# Patient Record
Sex: MALE | Race: BLACK OR AFRICAN AMERICAN | NOT HISPANIC OR LATINO | URBAN - METROPOLITAN AREA
[De-identification: names, ages, dates, MRNs, and addresses within clinical notes are randomized per-mention and may not be internally consistent; named-entity substitution may affect disease eponyms.]

---

## 2020-09-10 ENCOUNTER — INPATIENT (INPATIENT)
Facility: HOSPITAL | Age: 40
LOS: 3 days | Discharge: HOME | End: 2020-09-14
Attending: INTERNAL MEDICINE | Admitting: INTERNAL MEDICINE
Payer: MEDICAID

## 2020-09-10 VITALS
HEIGHT: 69 IN | TEMPERATURE: 102 F | SYSTOLIC BLOOD PRESSURE: 140 MMHG | WEIGHT: 154.98 LBS | RESPIRATION RATE: 19 BRPM | DIASTOLIC BLOOD PRESSURE: 80 MMHG | OXYGEN SATURATION: 100 % | HEART RATE: 83 BPM

## 2020-09-10 DIAGNOSIS — H00.036 ABSCESS OF EYELID LEFT EYE, UNSPECIFIED EYELID: ICD-10-CM

## 2020-09-10 DIAGNOSIS — B02.31 ZOSTER CONJUNCTIVITIS: ICD-10-CM

## 2020-09-10 LAB
ALBUMIN SERPL ELPH-MCNC: 3.7 G/DL — SIGNIFICANT CHANGE UP (ref 3.3–5)
ALP SERPL-CCNC: 52 U/L — SIGNIFICANT CHANGE UP (ref 40–120)
ALT FLD-CCNC: 20 U/L — SIGNIFICANT CHANGE UP (ref 12–78)
ANION GAP SERPL CALC-SCNC: 4 MMOL/L — LOW (ref 5–17)
APPEARANCE UR: CLEAR — SIGNIFICANT CHANGE UP
APTT BLD: 33.6 SEC — SIGNIFICANT CHANGE UP (ref 27.5–35.5)
AST SERPL-CCNC: 23 U/L — SIGNIFICANT CHANGE UP (ref 15–37)
BASOPHILS # BLD AUTO: 0.05 K/UL — SIGNIFICANT CHANGE UP (ref 0–0.2)
BASOPHILS NFR BLD AUTO: 0.9 % — SIGNIFICANT CHANGE UP (ref 0–2)
BILIRUB SERPL-MCNC: 0.4 MG/DL — SIGNIFICANT CHANGE UP (ref 0.2–1.2)
BILIRUB UR-MCNC: NEGATIVE — SIGNIFICANT CHANGE UP
BUN SERPL-MCNC: 3 MG/DL — LOW (ref 7–23)
CALCIUM SERPL-MCNC: 8.9 MG/DL — SIGNIFICANT CHANGE UP (ref 8.5–10.1)
CHLORIDE SERPL-SCNC: 103 MMOL/L — SIGNIFICANT CHANGE UP (ref 96–108)
CO2 SERPL-SCNC: 29 MMOL/L — SIGNIFICANT CHANGE UP (ref 22–31)
COLOR SPEC: YELLOW — SIGNIFICANT CHANGE UP
CREAT SERPL-MCNC: 0.92 MG/DL — SIGNIFICANT CHANGE UP (ref 0.5–1.3)
DIFF PNL FLD: NEGATIVE — SIGNIFICANT CHANGE UP
EOSINOPHIL # BLD AUTO: 0.01 K/UL — SIGNIFICANT CHANGE UP (ref 0–0.5)
EOSINOPHIL NFR BLD AUTO: 0.2 % — SIGNIFICANT CHANGE UP (ref 0–6)
GLUCOSE SERPL-MCNC: 98 MG/DL — SIGNIFICANT CHANGE UP (ref 70–99)
GLUCOSE UR QL: NEGATIVE MG/DL — SIGNIFICANT CHANGE UP
HCT VFR BLD CALC: 39.4 % — SIGNIFICANT CHANGE UP (ref 39–50)
HCT VFR BLD CALC: 42.8 % — SIGNIFICANT CHANGE UP (ref 39–50)
HGB BLD-MCNC: 13 G/DL — SIGNIFICANT CHANGE UP (ref 13–17)
HGB BLD-MCNC: 14.3 G/DL — SIGNIFICANT CHANGE UP (ref 13–17)
HIV 1 & 2 AB SERPL IA.RAPID: SIGNIFICANT CHANGE UP
IMM GRANULOCYTES NFR BLD AUTO: 0.2 % — SIGNIFICANT CHANGE UP (ref 0–1.5)
INR BLD: 1.09 RATIO — SIGNIFICANT CHANGE UP (ref 0.88–1.16)
KETONES UR-MCNC: NEGATIVE — SIGNIFICANT CHANGE UP
LACTATE SERPL-SCNC: 1.5 MMOL/L — SIGNIFICANT CHANGE UP (ref 0.7–2)
LEUKOCYTE ESTERASE UR-ACNC: NEGATIVE — SIGNIFICANT CHANGE UP
LYMPHOCYTES # BLD AUTO: 1.01 K/UL — SIGNIFICANT CHANGE UP (ref 1–3.3)
LYMPHOCYTES # BLD AUTO: 18.5 % — SIGNIFICANT CHANGE UP (ref 13–44)
MCHC RBC-ENTMCNC: 30.4 PG — SIGNIFICANT CHANGE UP (ref 27–34)
MCHC RBC-ENTMCNC: 30.7 PG — SIGNIFICANT CHANGE UP (ref 27–34)
MCHC RBC-ENTMCNC: 33 GM/DL — SIGNIFICANT CHANGE UP (ref 32–36)
MCHC RBC-ENTMCNC: 33.4 GM/DL — SIGNIFICANT CHANGE UP (ref 32–36)
MCV RBC AUTO: 91.1 FL — SIGNIFICANT CHANGE UP (ref 80–100)
MCV RBC AUTO: 92.9 FL — SIGNIFICANT CHANGE UP (ref 80–100)
MONOCYTES # BLD AUTO: 0.53 K/UL — SIGNIFICANT CHANGE UP (ref 0–0.9)
MONOCYTES NFR BLD AUTO: 9.7 % — SIGNIFICANT CHANGE UP (ref 2–14)
NEUTROPHILS # BLD AUTO: 3.84 K/UL — SIGNIFICANT CHANGE UP (ref 1.8–7.4)
NEUTROPHILS NFR BLD AUTO: 70.5 % — SIGNIFICANT CHANGE UP (ref 43–77)
NITRITE UR-MCNC: NEGATIVE — SIGNIFICANT CHANGE UP
NRBC # BLD: 0 /100 WBCS — SIGNIFICANT CHANGE UP (ref 0–0)
NRBC # BLD: 0 /100 WBCS — SIGNIFICANT CHANGE UP (ref 0–0)
PH UR: 8 — SIGNIFICANT CHANGE UP (ref 5–8)
PLATELET # BLD AUTO: 201 K/UL — SIGNIFICANT CHANGE UP (ref 150–400)
PLATELET # BLD AUTO: 212 K/UL — SIGNIFICANT CHANGE UP (ref 150–400)
POTASSIUM SERPL-MCNC: 4 MMOL/L — SIGNIFICANT CHANGE UP (ref 3.5–5.3)
POTASSIUM SERPL-SCNC: 4 MMOL/L — SIGNIFICANT CHANGE UP (ref 3.5–5.3)
PROT SERPL-MCNC: 7.4 GM/DL — SIGNIFICANT CHANGE UP (ref 6–8.3)
PROT UR-MCNC: NEGATIVE MG/DL — SIGNIFICANT CHANGE UP
PROTHROM AB SERPL-ACNC: 12.6 SEC — SIGNIFICANT CHANGE UP (ref 10.6–13.6)
RBC # BLD: 4.24 M/UL — SIGNIFICANT CHANGE UP (ref 4.2–5.8)
RBC # BLD: 4.7 M/UL — SIGNIFICANT CHANGE UP (ref 4.2–5.8)
RBC # FLD: 12.8 % — SIGNIFICANT CHANGE UP (ref 10.3–14.5)
RBC # FLD: 13 % — SIGNIFICANT CHANGE UP (ref 10.3–14.5)
SARS-COV-2 RNA SPEC QL NAA+PROBE: SIGNIFICANT CHANGE UP
SODIUM SERPL-SCNC: 136 MMOL/L — SIGNIFICANT CHANGE UP (ref 135–145)
SP GR SPEC: 1.01 — SIGNIFICANT CHANGE UP (ref 1.01–1.02)
UROBILINOGEN FLD QL: NEGATIVE MG/DL — SIGNIFICANT CHANGE UP
WBC # BLD: 4.84 K/UL — SIGNIFICANT CHANGE UP (ref 3.8–10.5)
WBC # BLD: 5.45 K/UL — SIGNIFICANT CHANGE UP (ref 3.8–10.5)
WBC # FLD AUTO: 4.84 K/UL — SIGNIFICANT CHANGE UP (ref 3.8–10.5)
WBC # FLD AUTO: 5.45 K/UL — SIGNIFICANT CHANGE UP (ref 3.8–10.5)

## 2020-09-10 PROCEDURE — 93010 ELECTROCARDIOGRAM REPORT: CPT

## 2020-09-10 PROCEDURE — 70480 CT ORBIT/EAR/FOSSA W/O DYE: CPT | Mod: 26,59

## 2020-09-10 PROCEDURE — 71045 X-RAY EXAM CHEST 1 VIEW: CPT | Mod: 26

## 2020-09-10 PROCEDURE — 99223 1ST HOSP IP/OBS HIGH 75: CPT

## 2020-09-10 PROCEDURE — 99285 EMERGENCY DEPT VISIT HI MDM: CPT

## 2020-09-10 PROCEDURE — 70450 CT HEAD/BRAIN W/O DYE: CPT | Mod: 26

## 2020-09-10 RX ORDER — ACETAMINOPHEN 500 MG
650 TABLET ORAL EVERY 6 HOURS
Refills: 0 | Status: DISCONTINUED | OUTPATIENT
Start: 2020-09-10 | End: 2020-09-14

## 2020-09-10 RX ORDER — OXYCODONE HYDROCHLORIDE 5 MG/1
5 TABLET ORAL ONCE
Refills: 0 | Status: DISCONTINUED | OUTPATIENT
Start: 2020-09-10 | End: 2020-09-10

## 2020-09-10 RX ORDER — MORPHINE SULFATE 50 MG/1
4 CAPSULE, EXTENDED RELEASE ORAL ONCE
Refills: 0 | Status: DISCONTINUED | OUTPATIENT
Start: 2020-09-10 | End: 2020-09-10

## 2020-09-10 RX ORDER — CEFAZOLIN SODIUM 1 G
2000 VIAL (EA) INJECTION EVERY 8 HOURS
Refills: 0 | Status: DISCONTINUED | OUTPATIENT
Start: 2020-09-10 | End: 2020-09-11

## 2020-09-10 RX ORDER — CEFAZOLIN SODIUM 1 G
2000 VIAL (EA) INJECTION EVERY 8 HOURS
Refills: 0 | Status: DISCONTINUED | OUTPATIENT
Start: 2020-09-10 | End: 2020-09-10

## 2020-09-10 RX ORDER — CEFAZOLIN SODIUM 1 G
2000 VIAL (EA) INJECTION ONCE
Refills: 0 | Status: COMPLETED | OUTPATIENT
Start: 2020-09-10 | End: 2020-09-10

## 2020-09-10 RX ORDER — ACYCLOVIR SODIUM 500 MG
350 VIAL (EA) INTRAVENOUS EVERY 8 HOURS
Refills: 0 | Status: DISCONTINUED | OUTPATIENT
Start: 2020-09-10 | End: 2020-09-14

## 2020-09-10 RX ORDER — ONDANSETRON 8 MG/1
4 TABLET, FILM COATED ORAL ONCE
Refills: 0 | Status: COMPLETED | OUTPATIENT
Start: 2020-09-10 | End: 2020-09-10

## 2020-09-10 RX ORDER — IBUPROFEN 200 MG
600 TABLET ORAL ONCE
Refills: 0 | Status: COMPLETED | OUTPATIENT
Start: 2020-09-10 | End: 2020-09-10

## 2020-09-10 RX ORDER — SODIUM CHLORIDE 9 MG/ML
1000 INJECTION INTRAMUSCULAR; INTRAVENOUS; SUBCUTANEOUS ONCE
Refills: 0 | Status: COMPLETED | OUTPATIENT
Start: 2020-09-10 | End: 2020-09-10

## 2020-09-10 RX ORDER — ACYCLOVIR SODIUM 500 MG
700 VIAL (EA) INTRAVENOUS ONCE
Refills: 0 | Status: COMPLETED | OUTPATIENT
Start: 2020-09-10 | End: 2020-09-10

## 2020-09-10 RX ORDER — ACETAMINOPHEN 500 MG
975 TABLET ORAL ONCE
Refills: 0 | Status: COMPLETED | OUTPATIENT
Start: 2020-09-10 | End: 2020-09-10

## 2020-09-10 RX ADMIN — SODIUM CHLORIDE 1000 MILLILITER(S): 9 INJECTION INTRAMUSCULAR; INTRAVENOUS; SUBCUTANEOUS at 14:30

## 2020-09-10 RX ADMIN — Medication 100 MILLIGRAM(S): at 21:34

## 2020-09-10 RX ADMIN — Medication 650 MILLIGRAM(S): at 21:33

## 2020-09-10 RX ADMIN — Medication 600 MILLIGRAM(S): at 14:05

## 2020-09-10 RX ADMIN — Medication 107 MILLIGRAM(S): at 21:34

## 2020-09-10 RX ADMIN — MORPHINE SULFATE 4 MILLIGRAM(S): 50 CAPSULE, EXTENDED RELEASE ORAL at 14:05

## 2020-09-10 RX ADMIN — Medication 975 MILLIGRAM(S): at 15:24

## 2020-09-10 RX ADMIN — Medication 650 MILLIGRAM(S): at 23:59

## 2020-09-10 RX ADMIN — SODIUM CHLORIDE 1000 MILLILITER(S): 9 INJECTION INTRAMUSCULAR; INTRAVENOUS; SUBCUTANEOUS at 13:36

## 2020-09-10 RX ADMIN — Medication 600 MILLIGRAM(S): at 13:35

## 2020-09-10 RX ADMIN — Medication 975 MILLIGRAM(S): at 14:54

## 2020-09-10 RX ADMIN — MORPHINE SULFATE 4 MILLIGRAM(S): 50 CAPSULE, EXTENDED RELEASE ORAL at 14:30

## 2020-09-10 RX ADMIN — Medication 114 MILLIGRAM(S): at 15:55

## 2020-09-10 RX ADMIN — Medication 100 MILLIGRAM(S): at 17:01

## 2020-09-10 RX ADMIN — ONDANSETRON 4 MILLIGRAM(S): 8 TABLET, FILM COATED ORAL at 14:05

## 2020-09-10 NOTE — PATIENT PROFILE ADULT - NSPROSPIRITUALVALUESFT_GEN_A_NUR
none at this time No Repair - Repaired With Adjacent Surgical Defect Text (Leave Blank If You Do Not Want): After obtaining clear surgical margins the defect was repaired concurrently with another surgical defect which was in close approximation.

## 2020-09-10 NOTE — ED PROVIDER NOTE - CARE PLAN
Principal Discharge DX:	Shingles rash  Secondary Diagnosis:	Eyelid cellulitis, left  Secondary Diagnosis:	Fever

## 2020-09-10 NOTE — ED PROVIDER NOTE - CLINICAL SUMMARY MEDICAL DECISION MAKING FREE TEXT BOX
pt presented with shingles of his face x 2 days, left eyelid cellulitis and fever, pt admitted for iv antibiotics and antivirals, ID consult

## 2020-09-10 NOTE — ED ADULT NURSE NOTE - OBJECTIVE STATEMENT
patient presents to the ED for left sonia pustules in clusters which was first noticed on Sunday. denies pain     Denies chest pain and shortness of breath

## 2020-09-10 NOTE — H&P ADULT - NSHPPHYSICALEXAM_GEN_ALL_CORE
ICU Vital Signs Last 24 Hrs  T(C): 37.7 (10 Sep 2020 15:42), Max: 39.4 (10 Sep 2020 14:28)  T(F): 99.9 (10 Sep 2020 15:42), Max: 103 (10 Sep 2020 14:28)  HR: 86 (10 Sep 2020 14:06) (83 - 86)  BP: 127/75 (10 Sep 2020 14:06) (127/75 - 140/80)  BP(mean): --  ABP: --  ABP(mean): --  RR: 18 (10 Sep 2020 14:06) (18 - 19)  SpO2: 100% (10 Sep 2020 10:41) (100% - 100%)  GENERAL: NAD well-developed  HEAD:  Atraumatic, Normocephalic  EYES: shingles to left forehead  and eye swellling   ENMT: No tonsillar erythema, exudates, or enlargement; Moist mucous membranes, Good dentition, No lesions  NECK: Supple, No JVD, Normal thyroid  NERVOUS SYSTEM:  Alert & Oriented X3, Good concentration; Motor Strength 5/5 B/L upper and lower extremities; DTRs 2+ intact and symmetric  CHEST/LUNG: Clear to percussion bilaterally; No rales, rhonchi, wheezing, or rubs  HEART: Regular rate and rhythm; No murmurs, rubs, or gallops  ABDOMEN: Soft, Nontender, Nondistended; Bowel sounds present  EXTREMITIES:  2+ Peripheral Pulses, No clubbing, cyanosis, or edema  LYMPH: No lymphadenopathy   SKIN: No rashes or lesions

## 2020-09-10 NOTE — ED PROVIDER NOTE - OBJECTIVE STATEMENT
39 year old with no past medical history presents today c/o facial rash for the last two days, pt describes pull filled areas which did open in some areas with fever today to the left face and mid face, +left ear tenderness anteriorly and swelling +left upper lid swelling, he  pt does report having a runny nose and cough within the last week, pt denies sick contacts or recent travel +h/o chicken pox

## 2020-09-10 NOTE — H&P ADULT - NSHPREVIEWOFSYSTEMS_GEN_ALL_CORE
CONSTITUTIONAL: No fever, weight loss, or fatigue  EYES: left  eye pain, visual disturbances,   ENMT:  No difficulty hearing, tinnitus, vertigo; No sinus or throat pain  NECK: No pain or stiffness  RESPIRATORY: No cough, wheezing, chills or hemoptysis; No shortness of breath  CARDIOVASCULAR: No chest pain, palpitations, dizziness, or leg swelling  GASTROINTESTINAL: No abdominal or epigastric pain. No nausea, vomiting, or hematemesis; No diarrhea or constipation. No melena or hematochezia.  GENITOURINARY: No dysuria, frequency, hematuria, or incontinence  NEUROLOGICAL: No headaches, memory loss, loss of strength, numbness, or tremors  SKIN: No itching, burning, rashes, or lesions   LYMPH NODES: No enlarged glands  ENDOCRINE: No heat or cold intolerance; No hair loss  MUSCULOSKELETAL: No joint pain or swelling; No muscle, back, or extremity pain  PSYCHIATRIC: No depression, anxiety, mood swings, or difficulty sleeping  HEME/LYMPH: No easy bruising, or bleeding gums  ALLERGY AND IMMUNOLOGIC: No hives or eczema

## 2020-09-11 LAB
CULTURE RESULTS: SIGNIFICANT CHANGE UP
SARS-COV-2 IGG SERPL QL IA: NEGATIVE — SIGNIFICANT CHANGE UP
SARS-COV-2 IGM SERPL IA-ACNC: <0.1 INDEX — SIGNIFICANT CHANGE UP
SPECIMEN SOURCE: SIGNIFICANT CHANGE UP

## 2020-09-11 PROCEDURE — 99233 SBSQ HOSP IP/OBS HIGH 50: CPT

## 2020-09-11 RX ORDER — VANCOMYCIN HCL 1 G
1000 VIAL (EA) INTRAVENOUS EVERY 12 HOURS
Refills: 0 | Status: DISCONTINUED | OUTPATIENT
Start: 2020-09-11 | End: 2020-09-13

## 2020-09-11 RX ORDER — CEFTRIAXONE 500 MG/1
2000 INJECTION, POWDER, FOR SOLUTION INTRAMUSCULAR; INTRAVENOUS EVERY 24 HOURS
Refills: 0 | Status: DISCONTINUED | OUTPATIENT
Start: 2020-09-11 | End: 2020-09-14

## 2020-09-11 RX ORDER — LANOLIN ALCOHOL/MO/W.PET/CERES
3 CREAM (GRAM) TOPICAL AT BEDTIME
Refills: 0 | Status: DISCONTINUED | OUTPATIENT
Start: 2020-09-11 | End: 2020-09-14

## 2020-09-11 RX ORDER — OXYCODONE HYDROCHLORIDE 5 MG/1
5 TABLET ORAL EVERY 6 HOURS
Refills: 0 | Status: DISCONTINUED | OUTPATIENT
Start: 2020-09-11 | End: 2020-09-14

## 2020-09-11 RX ORDER — ACETAMINOPHEN 500 MG
650 TABLET ORAL EVERY 6 HOURS
Refills: 0 | Status: DISCONTINUED | OUTPATIENT
Start: 2020-09-11 | End: 2020-09-14

## 2020-09-11 RX ADMIN — Medication 650 MILLIGRAM(S): at 10:55

## 2020-09-11 RX ADMIN — OXYCODONE HYDROCHLORIDE 5 MILLIGRAM(S): 5 TABLET ORAL at 00:52

## 2020-09-11 RX ADMIN — Medication 100 MILLIGRAM(S): at 05:39

## 2020-09-11 RX ADMIN — Medication 3 MILLIGRAM(S): at 21:13

## 2020-09-11 RX ADMIN — OXYCODONE HYDROCHLORIDE 5 MILLIGRAM(S): 5 TABLET ORAL at 16:27

## 2020-09-11 RX ADMIN — OXYCODONE HYDROCHLORIDE 5 MILLIGRAM(S): 5 TABLET ORAL at 17:27

## 2020-09-11 RX ADMIN — OXYCODONE HYDROCHLORIDE 5 MILLIGRAM(S): 5 TABLET ORAL at 05:40

## 2020-09-11 RX ADMIN — Medication 250 MILLIGRAM(S): at 17:39

## 2020-09-11 RX ADMIN — Medication 107 MILLIGRAM(S): at 14:23

## 2020-09-11 RX ADMIN — OXYCODONE HYDROCHLORIDE 5 MILLIGRAM(S): 5 TABLET ORAL at 00:22

## 2020-09-11 RX ADMIN — Medication 107 MILLIGRAM(S): at 05:39

## 2020-09-11 RX ADMIN — Medication 107 MILLIGRAM(S): at 21:12

## 2020-09-11 RX ADMIN — CEFTRIAXONE 100 MILLIGRAM(S): 500 INJECTION, POWDER, FOR SOLUTION INTRAMUSCULAR; INTRAVENOUS at 16:27

## 2020-09-11 RX ADMIN — Medication 650 MILLIGRAM(S): at 06:00

## 2020-09-11 NOTE — CONSULT NOTE ADULT - ASSESSMENT
39 year old man with no past medical history presents today c/o facial rash for the last two days, pt describes pus filled areas which did open in some areas with fever today to the left face and mid face, +left ear tenderness anteriorly and swelling +left upper lid swelling, he  pt does report having a runny nose and cough within the last week, pt denies sick contacts or recent travel +h/o chicken pox.  Admitted for Herpes zoster conjunctivitis.    Several episode of fevers reported  No leukocytosis    UA negative for infection    HIV NR    Head CT:    Marked left orbital preseptal cellulitis. No evidence of post septal cellulitis.     There is extensive subcutaneous edema and inflammation of the soft tissues of the left face. No drainable focal fluid collection is identified.     CXR: No infiltrates seen 39 year old man with no past medical history presents today c/o facial rash for the last two days, pt describes pus filled areas which did open in some areas with fever today to the left face and mid face, +left ear tenderness anteriorly and swelling +left upper lid swelling, he  pt does report having a runny nose and cough within the last week, pt denies sick contacts or recent travel +h/o chicken pox.  Admitted for Herpes zoster conjunctivitis.    Several episode of fevers reported  No leukocytosis    UA negative for infection    HIV NR    Head CT:    Marked left orbital preseptal cellulitis. No evidence of post septal cellulitis.     There is extensive subcutaneous edema and inflammation of the soft tissues of the left face. No drainable focal fluid collection is identified.     CXR: No infiltrates seen    Dx:  Left Herpes Zoster Ophthalmicus  Left Preseptal cellulitis    Rec:   -Continue Isolation precautions  -Continue Acyclovir IV for now  -Switch antibiotics to Vanco and Ceftriaxone  -Opthalmology evaluation  -Consider ENT evaluation  -BC x2    Thank you for the courtesy of this consultation.  Patient will be follow up closely with you.

## 2020-09-11 NOTE — CONSULT NOTE ADULT - SUBJECTIVE AND OBJECTIVE BOX
HPI:  39 year old man with no past medical history presents today c/o facial rash for the last two days, pt describes pus filled areas which did open in some areas with fever today to the left face and mid face, +left ear tenderness anteriorly and swelling +left upper lid swelling, he  pt does report having a runny nose and cough within the last week, pt denies sick contacts or recent travel +h/o chicken pox (10 Sep 2020 16:51)      PAST MEDICAL & SURGICAL HISTORY:  No pertinent past medical history  No significant past surgical history      SOCHX:   tobacco,  -  alcohol    FMHX: FA/MO  - contributory       Recent Travel:    Immunizations:    Allergies    No Known Allergies    Intolerances        MEDICATIONS  (STANDING):  acyclovir IVPB 350 milliGRAM(s) IV Intermittent every 8 hours  ceFAZolin   IVPB 2000 milliGRAM(s) IV Intermittent every 8 hours    MEDICATIONS  (PRN):  acetaminophen   Tablet .. 650 milliGRAM(s) Oral every 6 hours PRN Mild Pain (1 - 3)  acetaminophen   Tablet .. 650 milliGRAM(s) Oral every 6 hours PRN Temp greater or equal to 38C (100.4F)  oxyCODONE    IR 5 milliGRAM(s) Oral every 6 hours PRN Moderate Pain (4 - 6)      REVIEW OF SYSTEMS:  CONSTITUTIONAL: No fever, weight loss, or fatigue  EYES: No eye pain, visual disturbances, or discharge  ENMT:  No difficulty hearing, tinnitus, vertigo; No sinus or throat pain  NECK: No pain or stiffness  BREASTS: No pain, masses, or nipple discharge  RESPIRATORY: No cough, wheezing, chills or hemoptysis; No shortness of breath  CARDIOVASCULAR: No chest pain, palpitations, dizziness, or leg swelling  GASTROINTESTINAL: No abdominal or epigastric pain. No nausea, vomiting, or hematemesis; No diarrhea or constipation. No melena or hematochezia.  GENITOURINARY: No dysuria, frequency, hematuria, or incontinence  NEUROLOGICAL: No headaches, memory loss, loss of strength, numbness, or tremors  SKIN: No itching, burning, rashes, or lesions   LYMPH NODES: No enlarged glands  ENDOCRINE: No heat or cold intolerance; No hair loss  MUSCULOSKELETAL: No joint pain or swelling; No muscle, back, or extremity pain  PSYCHIATRIC: No depression, anxiety, mood swings, or difficulty sleeping  HEME/LYMPH: No easy bruising, or bleeding gums  ALLERGY AND IMMUNOLOGIC: No hives or eczema    VITAL SIGNS:    T(C): 39.1 (20 @ 05:56), Max: 39.4 (09-10-20 @ 14:28)  T(F): 102.3 (20 @ 05:56), Max: 103 (09-10-20 @ 14:28)  HR: 90 (20 @ 05:56) (75 - 92)  BP: 128/71 (20 @ 05:56) (121/69 - 140/80)  RR: 17 (20 @ 05:56) (14 - 19)  SpO2: 99% (20 @ 05:56) (97% - 100%)    PHYSICAL EXAM:    GENERAL: NAD, well-groomed, well-developed  HEAD:  Atraumatic, Normocephalic  EYES: EOMI, PERRLA, conjunctiva and sclera clear  ENMT: No tonsillar erythema, exudates, or enlargement; Moist mucous membranes, Good dentition, No lesions  NECK: Supple, No JVD, Normal thyroid  NERVOUS SYSTEM:  Alert & Oriented X3, Good concentration; Motor Strength 5/5 B/L upper and lower extremities; DTRs 2+ intact and symmetric  CHEST/LUNG: Clear bilaterally; No rales, rhonchi, wheezing bilaterally  HEART: Regular rate and rhythm; No murmurs, rubs, or gallops  ABDOMEN: Soft, Nontender, Nondistended; Bowel sounds present  EXTREMITIES:  2+ Peripheral Pulses, No clubbing, cyanosis, or edema  LYMPH: No lymphadenopathy noted  SKIN: No rashes or lesions  BACK: no pressor sore     LABS:                         13.0   4.84  )-----------( 201      ( 10 Sep 2020 17:48 )             39.4     09-10    136  |  103  |  3<L>  ----------------------------<  98  4.0   |  29  |  0.92    Ca    8.9      10 Sep 2020 14:03    TPro  7.4  /  Alb  3.7  /  TBili  0.4  /  DBili  x   /  AST  23  /  ALT  20  /  AlkPhos  52  09-10    LIVER FUNCTIONS - ( 10 Sep 2020 14:03 )  Alb: 3.7 g/dL / Pro: 7.4 gm/dL / ALK PHOS: 52 U/L / ALT: 20 U/L / AST: 23 U/L / GGT: x           PT/INR - ( 10 Sep 2020 14:03 )   PT: 12.6 sec;   INR: 1.09 ratio         PTT - ( 10 Sep 2020 14:03 )  PTT:33.6 sec  Urinalysis Basic - ( 10 Sep 2020 14:37 )    Color: Yellow / Appearance: Clear / S.010 / pH: x  Gluc: x / Ketone: Negative  / Bili: Negative / Urobili: Negative mg/dL   Blood: x / Protein: Negative mg/dL / Nitrite: Negative   Leuk Esterase: Negative / RBC: x / WBC x   Sq Epi: x / Non Sq Epi: x / Bacteria: x      Rapid HIV-1/2 Antibody: Nonreact (09-10 @ 17:48)        Radiology: HPI:  39 year old man with no past medical history presents today c/o facial rash for the last two days, pt describes pus filled areas which did open in some areas with fever today to the left face and mid face, +left ear tenderness anteriorly and swelling +left upper lid swelling, he  pt does report having a runny nose and cough within the last week, pt denies sick contacts or recent travel +h/o chicken pox (10 Sep 2020 16:51)      PAST MEDICAL & SURGICAL HISTORY:  No pertinent past medical history  No significant past surgical history      SOCHX:   tobacco,  -  alcohol    FMHX: FA/MO  - contributory       Recent Travel:    Immunizations:    Allergies    No Known Allergies    Intolerances        MEDICATIONS  (STANDING):  acyclovir IVPB 350 milliGRAM(s) IV Intermittent every 8 hours  ceFAZolin   IVPB 2000 milliGRAM(s) IV Intermittent every 8 hours    MEDICATIONS  (PRN):  acetaminophen   Tablet .. 650 milliGRAM(s) Oral every 6 hours PRN Mild Pain (1 - 3)  acetaminophen   Tablet .. 650 milliGRAM(s) Oral every 6 hours PRN Temp greater or equal to 38C (100.4F)  oxyCODONE    IR 5 milliGRAM(s) Oral every 6 hours PRN Moderate Pain (4 - 6)      REVIEW OF SYSTEMS:  CONSTITUTIONAL: No fever, weight loss, or fatigue  EYES: No eye pain, visual disturbances, or discharge  ENMT:  No difficulty hearing, tinnitus, vertigo; No sinus or throat pain  NECK: No pain or stiffness  BREASTS: No pain, masses, or nipple discharge  RESPIRATORY: No cough, wheezing, chills or hemoptysis; No shortness of breath  CARDIOVASCULAR: No chest pain, palpitations, dizziness, or leg swelling  GASTROINTESTINAL: No abdominal or epigastric pain. No nausea, vomiting, or hematemesis; No diarrhea or constipation. No melena or hematochezia.  GENITOURINARY: No dysuria, frequency, hematuria, or incontinence  NEUROLOGICAL: No headaches, memory loss, loss of strength, numbness, or tremors  SKIN: No itching, burning, rashes, or lesions   LYMPH NODES: No enlarged glands  ENDOCRINE: No heat or cold intolerance; No hair loss  MUSCULOSKELETAL: No joint pain or swelling; No muscle, back, or extremity pain  PSYCHIATRIC: No depression, anxiety, mood swings, or difficulty sleeping  HEME/LYMPH: No easy bruising, or bleeding gums  ALLERGY AND IMMUNOLOGIC: No hives or eczema    VITAL SIGNS:    T(C): 39.1 (20 @ 05:56), Max: 39.4 (09-10-20 @ 14:28)  T(F): 102.3 (20 @ 05:56), Max: 103 (09-10-20 @ 14:28)  HR: 90 (20 @ 05:56) (75 - 92)  BP: 128/71 (20 @ 05:56) (121/69 - 140/80)  RR: 17 (20 @ 05:56) (14 - 19)  SpO2: 99% (20 @ 05:56) (97% - 100%)    PHYSICAL EXAM:    GENERAL: Acutely ill, well-groomed, well-developed  HEAD:  Atraumatic, Normocephalic  EYES: EOMI, CARIN, Multiple lesion noted in different stage vesicle, ulcers, crusted over erythematous base in the v1,v2 and v3 dermatomes-- left side.  It extending to the left ear, tenderness to palpation noted.  ENMT: No tonsillar erythema, exudates, or enlargement; Moist mucous membranes, Good dentition, No lesions  NECK: Supple, No JVD, Normal thyroid  NERVOUS SYSTEM:  Alert & Oriented X3, Good concentration; Motor Strength 5/5 B/L upper and lower extremities; DTRs 2+ intact and symmetric  CHEST/LUNG: Clear bilaterally; No rales, rhonchi, wheezing bilaterally  HEART: Regular rate and rhythm; No murmurs, rubs, or gallops  ABDOMEN: Soft, Nontender, Nondistended; Bowel sounds present  EXTREMITIES:  2+ Peripheral Pulses, No clubbing, cyanosis, or edema  LYMPH: No lymphadenopathy noted  SKIN: No rashes or lesions  BACK: no pressor sore     LABS:                         13.0   4.84  )-----------( 201      ( 10 Sep 2020 17:48 )             39.4     09-10    136  |  103  |  3<L>  ----------------------------<  98  4.0   |  29  |  0.92    Ca    8.9      10 Sep 2020 14:03    TPro  7.4  /  Alb  3.7  /  TBili  0.4  /  DBili  x   /  AST  23  /  ALT  20  /  AlkPhos  52  09-10    LIVER FUNCTIONS - ( 10 Sep 2020 14:03 )  Alb: 3.7 g/dL / Pro: 7.4 gm/dL / ALK PHOS: 52 U/L / ALT: 20 U/L / AST: 23 U/L / GGT: x           PT/INR - ( 10 Sep 2020 14:03 )   PT: 12.6 sec;   INR: 1.09 ratio         PTT - ( 10 Sep 2020 14:03 )  PTT:33.6 sec  Urinalysis Basic - ( 10 Sep 2020 14:37 )    Color: Yellow / Appearance: Clear / S.010 / pH: x  Gluc: x / Ketone: Negative  / Bili: Negative / Urobili: Negative mg/dL   Blood: x / Protein: Negative mg/dL / Nitrite: Negative   Leuk Esterase: Negative / RBC: x / WBC x   Sq Epi: x / Non Sq Epi: x / Bacteria: x      Rapid HIV-1/2 Antibody: Nonreact (09-10 @ 17:48)        Radiology:

## 2020-09-12 PROCEDURE — 99232 SBSQ HOSP IP/OBS MODERATE 35: CPT

## 2020-09-12 RX ORDER — ERYTHROMYCIN BASE 5 MG/GRAM
1 OINTMENT (GRAM) OPHTHALMIC (EYE)
Refills: 0 | Status: DISCONTINUED | OUTPATIENT
Start: 2020-09-12 | End: 2020-09-14

## 2020-09-12 RX ORDER — SENNA PLUS 8.6 MG/1
2 TABLET ORAL AT BEDTIME
Refills: 0 | Status: DISCONTINUED | OUTPATIENT
Start: 2020-09-12 | End: 2020-09-14

## 2020-09-12 RX ORDER — ZOLPIDEM TARTRATE 10 MG/1
5 TABLET ORAL AT BEDTIME
Refills: 0 | Status: DISCONTINUED | OUTPATIENT
Start: 2020-09-12 | End: 2020-09-14

## 2020-09-12 RX ADMIN — ZOLPIDEM TARTRATE 5 MILLIGRAM(S): 10 TABLET ORAL at 21:29

## 2020-09-12 RX ADMIN — Medication 107 MILLIGRAM(S): at 06:00

## 2020-09-12 RX ADMIN — OXYCODONE HYDROCHLORIDE 5 MILLIGRAM(S): 5 TABLET ORAL at 22:02

## 2020-09-12 RX ADMIN — CEFTRIAXONE 100 MILLIGRAM(S): 500 INJECTION, POWDER, FOR SOLUTION INTRAMUSCULAR; INTRAVENOUS at 15:46

## 2020-09-12 RX ADMIN — OXYCODONE HYDROCHLORIDE 5 MILLIGRAM(S): 5 TABLET ORAL at 06:30

## 2020-09-12 RX ADMIN — Medication 107 MILLIGRAM(S): at 21:29

## 2020-09-12 RX ADMIN — Medication 250 MILLIGRAM(S): at 18:17

## 2020-09-12 RX ADMIN — Medication 1 APPLICATION(S): at 18:17

## 2020-09-12 RX ADMIN — OXYCODONE HYDROCHLORIDE 5 MILLIGRAM(S): 5 TABLET ORAL at 14:30

## 2020-09-12 RX ADMIN — Medication 250 MILLIGRAM(S): at 06:00

## 2020-09-12 RX ADMIN — Medication 1 APPLICATION(S): at 23:09

## 2020-09-12 RX ADMIN — Medication 107 MILLIGRAM(S): at 14:33

## 2020-09-12 RX ADMIN — OXYCODONE HYDROCHLORIDE 5 MILLIGRAM(S): 5 TABLET ORAL at 15:30

## 2020-09-12 RX ADMIN — OXYCODONE HYDROCHLORIDE 5 MILLIGRAM(S): 5 TABLET ORAL at 05:30

## 2020-09-12 RX ADMIN — OXYCODONE HYDROCHLORIDE 5 MILLIGRAM(S): 5 TABLET ORAL at 23:02

## 2020-09-13 LAB — VANCOMYCIN TROUGH SERPL-MCNC: 6.1 UG/ML — LOW (ref 10–20)

## 2020-09-13 PROCEDURE — 99232 SBSQ HOSP IP/OBS MODERATE 35: CPT

## 2020-09-13 RX ORDER — VANCOMYCIN HCL 1 G
1250 VIAL (EA) INTRAVENOUS EVERY 12 HOURS
Refills: 0 | Status: DISCONTINUED | OUTPATIENT
Start: 2020-09-13 | End: 2020-09-14

## 2020-09-13 RX ADMIN — Medication 107 MILLIGRAM(S): at 13:57

## 2020-09-13 RX ADMIN — Medication 250 MILLIGRAM(S): at 06:29

## 2020-09-13 RX ADMIN — Medication 650 MILLIGRAM(S): at 09:19

## 2020-09-13 RX ADMIN — Medication 1 APPLICATION(S): at 06:29

## 2020-09-13 RX ADMIN — Medication 1 APPLICATION(S): at 12:04

## 2020-09-13 RX ADMIN — Medication 107 MILLIGRAM(S): at 22:12

## 2020-09-13 RX ADMIN — CEFTRIAXONE 100 MILLIGRAM(S): 500 INJECTION, POWDER, FOR SOLUTION INTRAMUSCULAR; INTRAVENOUS at 15:43

## 2020-09-13 RX ADMIN — Medication 166.67 MILLIGRAM(S): at 17:47

## 2020-09-13 RX ADMIN — Medication 107 MILLIGRAM(S): at 06:29

## 2020-09-13 RX ADMIN — Medication 1 APPLICATION(S): at 17:47

## 2020-09-13 RX ADMIN — ZOLPIDEM TARTRATE 5 MILLIGRAM(S): 10 TABLET ORAL at 22:12

## 2020-09-13 RX ADMIN — Medication 650 MILLIGRAM(S): at 10:20

## 2020-09-14 VITALS
TEMPERATURE: 98 F | SYSTOLIC BLOOD PRESSURE: 109 MMHG | DIASTOLIC BLOOD PRESSURE: 71 MMHG | OXYGEN SATURATION: 99 % | HEART RATE: 82 BPM | RESPIRATION RATE: 18 BRPM

## 2020-09-14 PROCEDURE — 99239 HOSP IP/OBS DSCHRG MGMT >30: CPT

## 2020-09-14 RX ORDER — VALACYCLOVIR 500 MG/1
1 TABLET, FILM COATED ORAL
Qty: 30 | Refills: 0
Start: 2020-09-14

## 2020-09-14 RX ADMIN — Medication 1 APPLICATION(S): at 05:49

## 2020-09-14 RX ADMIN — Medication 107 MILLIGRAM(S): at 05:49

## 2020-09-14 RX ADMIN — Medication 1 APPLICATION(S): at 13:01

## 2020-09-14 RX ADMIN — Medication 166.67 MILLIGRAM(S): at 05:48

## 2020-09-14 RX ADMIN — Medication 1 APPLICATION(S): at 00:06

## 2020-09-14 NOTE — PROGRESS NOTE ADULT - ASSESSMENT
facial Herpes zoster/ ophthalmicus with preseptal cellulitis   status post phone consult with opth   no fever   no other complaint, vision intact and no pain on eye rom     on acyclovir and antibiotics   all cultures clear   clinically responding   lesion looks dry and crusted today   can switch to Valtrex PO 1 gm Q 8 hr  and PO Augmentin 500 Q 12 hr for 10 days upon discharge.   case discussed with patient and RN   thanks
1.	Left herpes zoster ophthalmicus and left preseptal cellulitis. Extensive soft tissue edema on CT head and orbital. Appreciated ID consult recs. cont iv vancomycin and ceftriaxone. Follow vanco trough. Patient needs ophthalmology and ENT eval but unfortunately [facility] does not have both. Will transfer patient to [facility] as soon as accepted. cont pain control meanwhile. patient is at high risk for vision impairment.     Full code.
Left herpes zoster ophthalmicus and left preseptal cellulitis. Extensive soft tissue edema on CT head and orbital. Appreciated ID consult recs. cont iv vancomycin, acyclovir and ceftriaxone. Follow vanco trough. discussed with Dr Monique at [facility] on phone after he reviewed the pictures of the patient. recommended to add erythromycin ointment. no need for transfer at this point. cont current pain control and add senna for constipation prophylaxis.   insomonia. start ambien prn.   add ensure to diet     Full code.
Left herpes zoster ophthalmicus and left preseptal cellulitis. Extensive soft tissue edema on CT head and orbital. improving. vanco trough < 10. increase iv vancomycin. Cont acyclovir and ceftriaxone. Cont erythromycin ointment. cont current pain control and senna for constipation prophylaxis.   insomonia. Cont ambien prn.   Cont ensure to diet     Full code.
herpes zoster face/ ophthalmicus  status post phone consult with opth   preseptal cellulitis       plan  cont antibiotics  increase vanco   HIV Test neg   cont acyclovir   monitor creat

## 2020-09-14 NOTE — DISCHARGE NOTE PROVIDER - PROVIDER TOKENS
FREE:[LAST:[Your PCP in a week],PHONE:[(   )    -],FAX:[(   )    -]],FREE:[LAST:[[facility] adult ophthalmology in [place], [place] in 1 week],PHONE:[(   )    -],FAX:[(   )    -]]

## 2020-09-14 NOTE — DISCHARGE NOTE PROVIDER - NSDCMRMEDTOKEN_GEN_ALL_CORE_FT
Augmentin 875 mg-125 mg oral tablet: 1 tab(s) orally 2 times a day   Valtrex 1 g oral tablet: 1 tab(s) orally 3 times a day

## 2020-09-14 NOTE — DISCHARGE NOTE PROVIDER - NSDCCPCAREPLAN_GEN_ALL_CORE_FT
PRINCIPAL DISCHARGE DIAGNOSIS  Diagnosis: Shingles rash  Assessment and Plan of Treatment:       SECONDARY DISCHARGE DIAGNOSES  Diagnosis: Fever  Assessment and Plan of Treatment:     Diagnosis: Eyelid cellulitis, left  Assessment and Plan of Treatment:

## 2020-09-14 NOTE — DISCHARGE NOTE PROVIDER - CARE PROVIDER_API CALL
Your PCP in a week,   Phone: (   )    -  Fax: (   )    -  Follow Up Time:     [facility] for adult ophthalmology in [place], [place] in 1 week,   Phone: (   )    -  Fax: (   )    -  Follow Up Time:

## 2020-09-14 NOTE — DISCHARGE NOTE PROVIDER - HOSPITAL COURSE
HPI: 39 year old with no past medical history presents today c/o facial rash for the last two days, pt describes pull filled areas which did open in some areas with fever today to the left face and mid face, +left ear tenderness anteriorly and swelling +left upper lid swelling, he  pt does report having a runny nose and cough within the last week, pt denies sick contacts or recent travel +h/o chicken pox      Hospital course:     Left herpes zoster ophthalmicus and left preseptal cellulitis. Extensive soft tissue edema on CT head and orbital. improved significantly. discussed with ID>>> ok to discharge on valtrex and augmentin. outpatient follow up with [facility] for adult ophthalmology is strongly recommended.     Seen and examined by me today. Vitals stable.   All questions welcomed and answered appropriately. Patient verbalized understanding of post discharge physician's follows up and discharge instructions.   DC time spent by me excluding billable procedures 38 mins

## 2020-09-14 NOTE — PROGRESS NOTE ADULT - SUBJECTIVE AND OBJECTIVE BOX
CHIEF COMPLAINT: Follow up of herpes zoster ophthalmicus  pain and swelling left periorbital area better.   no n/v/d/cp/sob/abd pain/dysuria/sore throat  + poor sleep even with melatonin  no BM so far.      PHYSICAL EXAM:    GENERAL: well built, well nourished  HEENT: improving tender swollen left periorbital area, no purulent visible discharge.   CHEST/LUNG: Clear to ausculation bilaterally, no wheezing, no crackles   HEART: Regular rate and rhythm; No murmurs, rubs  ABDOMEN: Soft, Nontender, Nondistended; Bowel sounds present  EXTREMITIES:  Moving all four extremities spontaneously, No clubbing, cyanosis, or edema  NERVOUS SYSTEM:  Grossly non focal.  Psychiatry: AAO x 3, mood is appropriate       OBJECTIVE DATA:     Vital Signs Last 24 Hrs  T(C): 36.7 (13 Sep 2020 05:00), Max: 36.9 (12 Sep 2020 17:00)  T(F): 98.1 (13 Sep 2020 05:00), Max: 98.5 (12 Sep 2020 17:00)  HR: 65 (13 Sep 2020 05:00) (65 - 71)  BP: 107/75 (13 Sep 2020 05:00) (107/75 - 119/78)  BP(mean): --  RR: 18 (13 Sep 2020 05:00) (18 - 18)  SpO2: 98% (13 Sep 2020 05:00) (98% - 100%)           Daily     Daily Weight in k (13 Sep 2020 05:00)      Culture - Blood (collected )  Source: .Blood Blood  Preliminary Report ():    No growth to date.    Culture - Blood (collected )  Source: .Blood Blood  Preliminary Report ():    No growth to date.    Culture - Urine (collected 09-10)  Source: .Urine Clean Catch (Midstream)  Final Report ():    <10,000 CFU/mL Normal Urogenital Hillary    Culture - Blood (collected 09-10)  Source: .Blood Blood-Peripheral  Preliminary Report ():    No growth to date.    Culture - Blood (collected 09-10)  Source: .Blood Blood-Peripheral  Preliminary Report ():    No growth to date.    MEDICATIONS  (STANDING):  acyclovir IVPB 350 milliGRAM(s) IV Intermittent every 8 hours  cefTRIAXone   IVPB 2000 milliGRAM(s) IV Intermittent every 24 hours  erythromycin   Ointment 1 Application(s) Left EYE four times a day  senna 2 Tablet(s) Oral at bedtime  vancomycin  IVPB 1250 milliGRAM(s) IV Intermittent every 12 hours    MEDICATIONS  (PRN):  acetaminophen   Tablet .. 650 milliGRAM(s) Oral every 6 hours PRN Temp greater or equal to 38C (100.4F)  acetaminophen   Tablet .. 650 milliGRAM(s) Oral every 6 hours PRN Mild Pain (1 - 3)  melatonin 3 milliGRAM(s) Oral at bedtime PRN Insomnia  oxyCODONE    IR 5 milliGRAM(s) Oral every 6 hours PRN Moderate Pain (4 - 6)  zolpidem 5 milliGRAM(s) Oral at bedtime PRN Insomnia      
  CHIEF COMPLAINT: Follow up of herpes zoster ophthalmicus  pain left face  no more fever.   no n/v/d/cp/sob/abd pain/dysuria/sore throat  + poor sleep even with melatonin  no BM so far.      PHYSICAL EXAM:    GENERAL: well built, well nourished  HEENT: improving tender swollen left periorbital area, no purulent visible discharge.   CHEST/LUNG: Clear to ausculation bilaterally, no wheezing, no crackles   HEART: Regular rate and rhythm; No murmurs, rubs  ABDOMEN: Soft, Nontender, Nondistended; Bowel sounds present  EXTREMITIES:  Moving all four extremities spontaneously, No clubbing, cyanosis, or edema  NERVOUS SYSTEM:  Grossly non focal.  Psychiatry: AAO x 3, mood is appropriate       OBJECTIVE DATA:     Vital Signs Last 24 Hrs  T(C): 37.3 (12 Sep 2020 08:38), Max: 37.6 (11 Sep 2020 17:15)  T(F): 99.1 (12 Sep 2020 08:38), Max: 99.6 (11 Sep 2020 17:15)  HR: 65 (12 Sep 2020 08:38) (65 - 71)  BP: 116/77 (12 Sep 2020 08:38) (116/77 - 127/72)  BP(mean): --  RR: 17 (12 Sep 2020 08:38) (16 - 17)  SpO2: 99% (12 Sep 2020 08:38) (98% - 99%)           Daily     Daily   LABS:                        13.0   4.84  )-----------( 201      ( 10 Sep 2020 17:48 )             39.4             09-10    136  |  103  |  3<L>  ----------------------------<  98  4.0   |  29  |  0.92    Ca    8.9      10 Sep 2020 14:03    TPro  7.4  /  Alb  3.7  /  TBili  0.4  /  DBili  x   /  AST  23  /  ALT  20  /  AlkPhos  52  09-10              PT/INR - ( 10 Sep 2020 14:03 )   PT: 12.6 sec;   INR: 1.09 ratio         PTT - ( 10 Sep 2020 14:03 )  PTT:33.6 sec  Urinalysis Basic - ( 10 Sep 2020 14:37 )    Color: Yellow / Appearance: Clear / S.010 / pH: x  Gluc: x / Ketone: Negative  / Bili: Negative / Urobili: Negative mg/dL   Blood: x / Protein: Negative mg/dL / Nitrite: Negative   Leuk Esterase: Negative / RBC: x / WBC x   Sq Epi: x / Non Sq Epi: x / Bacteria: x           CAPILLARY BLOOD GLUCOSE          Culture - Urine (collected 09-10)  Source: .Urine Clean Catch (Midstream)  Final Report ():    <10,000 CFU/mL Normal Urogenital Hillary    Culture - Blood (collected 09-10)  Source: .Blood Blood-Peripheral  Preliminary Report ():    No growth to date.    Culture - Blood (collected 09-10)  Source: .Blood Blood-Peripheral  Preliminary Report ():    No growth to date.        MEDICATIONS  (STANDING):  acyclovir IVPB 350 milliGRAM(s) IV Intermittent every 8 hours  cefTRIAXone   IVPB 2000 milliGRAM(s) IV Intermittent every 24 hours  erythromycin   Ointment 1 Application(s) Left EYE four times a day  senna 2 Tablet(s) Oral at bedtime  vancomycin  IVPB 1000 milliGRAM(s) IV Intermittent every 12 hours    MEDICATIONS  (PRN):  acetaminophen   Tablet .. 650 milliGRAM(s) Oral every 6 hours PRN Temp greater or equal to 38C (100.4F)  acetaminophen   Tablet .. 650 milliGRAM(s) Oral every 6 hours PRN Mild Pain (1 - 3)  melatonin 3 milliGRAM(s) Oral at bedtime PRN Insomnia  oxyCODONE    IR 5 milliGRAM(s) Oral every 6 hours PRN Moderate Pain (4 - 6)  zolpidem 5 milliGRAM(s) Oral at bedtime PRN Insomnia      
CHIEF COMPLAINT: Follow up of herpes zoster ophthalmicus  pain left face  + fever spikes  no n/v/d/cp/sob/abd pain/dysuria/sore throat  no eye pain or vision changes reported by patient       PHYSICAL EXAM:    GENERAL: well built, well nourished  HEENT: tender swollen left periorbital area with no purulent visible discharge.   CHEST/LUNG: Clear to ausculation bilaterally, no wheezing, no crackles   HEART: Regular rate and rhythm; No murmurs, rubs  ABDOMEN: Soft, Nontender, Nondistended; Bowel sounds present  EXTREMITIES:  Moving all four extremities spontaneously, No clubbing, cyanosis, or edema  NERVOUS SYSTEM:  Grossly non focal.  Psychiatry: AAO x 3, mood is appropriate       OBJECTIVE DATA:   Vital Signs Last 24 Hrs  T(C): 36.5 (11 Sep 2020 11:09), Max: 39.4 (10 Sep 2020 14:28)  T(F): 97.7 (11 Sep 2020 11:09), Max: 103 (10 Sep 2020 14:28)  HR: 91 (11 Sep 2020 11:09) (75 - 92)  BP: 124/76 (11 Sep 2020 11:09) (121/69 - 130/74)  BP(mean): --  RR: 18 (11 Sep 2020 11:09) (14 - 18)  SpO2: 100% (11 Sep 2020 11:09) (97% - 100%)           Daily Height in cm: 175.26 (10 Sep 2020 21:03)    Daily Weight in k.3 (10 Sep 2020 21:03)  LABS:                        13.0   4.84  )-----------( 201      ( 10 Sep 2020 17:48 )             39.4             09-10    136  |  103  |  3<L>  ----------------------------<  98  4.0   |  29  |  0.92    Ca    8.9      10 Sep 2020 14:03    TPro  7.4  /  Alb  3.7  /  TBili  0.4  /  DBili  x   /  AST  23  /  ALT  20  /  AlkPhos  52  09-10              PT/INR - ( 10 Sep 2020 14:03 )   PT: 12.6 sec;   INR: 1.09 ratio         PTT - ( 10 Sep 2020 14:03 )  PTT:33.6 sec  Urinalysis Basic - ( 10 Sep 2020 14:37 )    Color: Yellow / Appearance: Clear / S.010 / pH: x  Gluc: x / Ketone: Negative  / Bili: Negative / Urobili: Negative mg/dL   Blood: x / Protein: Negative mg/dL / Nitrite: Negative   Leuk Esterase: Negative / RBC: x / WBC x   Sq Epi: x / Non Sq Epi: x / Bacteria: x            Interval Radiology studies: reviewed by me    < from: CT Head No Cont (09.10.20 @ 20:43) >  There is left frontal scalp and subcutaneous soft tissue inflammation that extends over the left face. There is left preseptal inflammation.    < end of copied text >      MEDICATIONS  (STANDING):  acyclovir IVPB 350 milliGRAM(s) IV Intermittent every 8 hours  cefTRIAXone   IVPB 2000 milliGRAM(s) IV Intermittent every 24 hours  vancomycin  IVPB 1000 milliGRAM(s) IV Intermittent every 12 hours    MEDICATIONS  (PRN):  acetaminophen   Tablet .. 650 milliGRAM(s) Oral every 6 hours PRN Temp greater or equal to 38C (100.4F)  acetaminophen   Tablet .. 650 milliGRAM(s) Oral every 6 hours PRN Mild Pain (1 - 3)  oxyCODONE    IR 5 milliGRAM(s) Oral every 6 hours PRN Moderate Pain (4 - 6)
HPI:  39 year old with no past medical history presents today c/o facial rash for the last two days, pt describes pull filled areas which did open in some areas with fever today to the left face and mid face, +left ear tenderness anteriorly and swelling +left upper lid swelling, he  pt does report having a runny nose and cough within the last week, pt denies sick contacts or recent travel +h/o chicken pox (10 Sep 2020 16:51)      Allergies    No Known Allergies    Intolerances        MEDICATIONS  (STANDING):  acyclovir IVPB 350 milliGRAM(s) IV Intermittent every 8 hours  cefTRIAXone   IVPB 2000 milliGRAM(s) IV Intermittent every 24 hours  erythromycin   Ointment 1 Application(s) Left EYE four times a day  senna 2 Tablet(s) Oral at bedtime  vancomycin  IVPB 1000 milliGRAM(s) IV Intermittent every 12 hours    MEDICATIONS  (PRN):  acetaminophen   Tablet .. 650 milliGRAM(s) Oral every 6 hours PRN Temp greater or equal to 38C (100.4F)  acetaminophen   Tablet .. 650 milliGRAM(s) Oral every 6 hours PRN Mild Pain (1 - 3)  melatonin 3 milliGRAM(s) Oral at bedtime PRN Insomnia  oxyCODONE    IR 5 milliGRAM(s) Oral every 6 hours PRN Moderate Pain (4 - 6)  zolpidem 5 milliGRAM(s) Oral at bedtime PRN Insomnia      REVIEW OF SYSTEMS:    CONSTITUTIONAL: No fever, chills, weight loss, or fatigue  HEENT: No sore throat, runny nose, ear ache  RESPIRATORY: No cough, wheezing, No shortness of breath  CARDIOVASCULAR: No chest pain, palpitations, dizziness  GASTROINTESTINAL: No abdominal pain. No nausea, vomiting, diarrhea  GENITOURINARY: No dysuria, increase frequency, hematuria, or incontinence  NEUROLOGICAL: No headaches, memory loss, loss of strength, numbness, or tremors, no weakness  EXTREMITY: No pedal edema BLE  SKIN: No itching, burning, rashes, or lesions     VITAL SIGNS:  T(C): 36.7 (09-13-20 @ 05:00), Max: 36.9 (09-12-20 @ 17:00)  T(F): 98.1 (09-13-20 @ 05:00), Max: 98.5 (09-12-20 @ 17:00)  HR: 65 (09-13-20 @ 05:00) (65 - 71)  BP: 107/75 (09-13-20 @ 05:00) (107/75 - 119/78)  RR: 18 (09-13-20 @ 05:00) (18 - 18)  SpO2: 98% (09-13-20 @ 05:00) (98% - 100%)  Wt(kg): --    PHYSICAL EXAM:    GENERAL: not in any distress  HEENT: vesicular rash less swelling no visual complaints pain no pain   CHEST/LUNG: Clear to percussion bilaterally; No rales, rhonchi, wheezing  HEART: Regular rate and rhythm; No murmurs, rubs, or gallops  ABDOMEN: Soft, Nontender, Nondistended; Bowel sounds present, no rebound   EXTREMITIES:  2+ Peripheral Pulses, No clubbing, cyanosis, or edema  GENITOURINARY:   SKIN: No rashes or lesions  NERVOUS SYSTEM:  Alert & Oriented X3, Good concentration      LABS:       Rapid HIV-1/2 Antibody: Nonreact (09-10 @ 17:48)      Vancomycin Level, Trough: 6.1 ug/mL (09-13 @ 04:16)        Culture Results:   No growth to date. (09-11 @ 16:29)  Culture Results:   No growth to date. (09-11 @ 16:29)  Culture Results:   <10,000 CFU/mL Normal Urogenital Hillary (09-10 @ 21:11)  Culture Results:   No growth to date. (09-10 @ 21:09)  Culture Results:   No growth to date. (09-10 @ 20:56)                      
HPI:  39 year old with no past medical history presents today c/o facial rash for the last two days, pt describes pull filled areas which did open in some areas with fever today to the left face and mid face, +left ear tenderness anteriorly and swelling +left upper lid swelling, he  pt does report having a runny nose and cough within the last week, pt denies sick contacts or recent travel +h/o chicken pox (10 Sep 2020 16:51)      Allergies    No Known Allergies    Intolerances        MEDICATIONS  (STANDING):  acyclovir IVPB 350 milliGRAM(s) IV Intermittent every 8 hours  cefTRIAXone   IVPB 2000 milliGRAM(s) IV Intermittent every 24 hours  erythromycin   Ointment 1 Application(s) Left EYE four times a day  senna 2 Tablet(s) Oral at bedtime  vancomycin  IVPB 1250 milliGRAM(s) IV Intermittent every 12 hours    MEDICATIONS  (PRN):  acetaminophen   Tablet .. 650 milliGRAM(s) Oral every 6 hours PRN Temp greater or equal to 38C (100.4F)  acetaminophen   Tablet .. 650 milliGRAM(s) Oral every 6 hours PRN Mild Pain (1 - 3)  melatonin 3 milliGRAM(s) Oral at bedtime PRN Insomnia  oxyCODONE    IR 5 milliGRAM(s) Oral every 6 hours PRN Moderate Pain (4 - 6)  zolpidem 5 milliGRAM(s) Oral at bedtime PRN Insomnia      REVIEW OF SYSTEMS:    CONSTITUTIONAL: No fever, chills, weight loss, or fatigue  HEENT: No sore throat, runny nose, ear ache  RESPIRATORY: No cough, wheezing, No shortness of breath  CARDIOVASCULAR: No chest pain, palpitations, dizziness  GASTROINTESTINAL: No abdominal pain. No nausea, vomiting, diarrhea  GENITOURINARY: No dysuria, increase frequency, hematuria, or incontinence  NEUROLOGICAL: No headaches, memory loss, loss of strength, numbness, or tremors, no weakness  EXTREMITY: No pedal edema BLE  SKIN: No itching, burning, rashes, or lesions     VITAL SIGNS:  T(C): 36.6 (09-14-20 @ 10:09), Max: 37.3 (09-13-20 @ 13:04)  T(F): 97.8 (09-14-20 @ 10:09), Max: 99.1 (09-13-20 @ 13:04)  HR: 82 (09-14-20 @ 10:09) (68 - 88)  BP: 109/71 (09-14-20 @ 10:09) (104/57 - 109/71)  RR: 18 (09-14-20 @ 10:09) (16 - 18)  SpO2: 99% (09-14-20 @ 10:09) (99% - 100%)  Wt(kg): --    PHYSICAL EXAM:    GENERAL: not in any distress  left facial lesion on forehead and tip of nose dry and crusted   HEENT: Neck is supple, normocephalic, atraumatic   CHEST/LUNG: Clear to percussion bilaterally; No rales, rhonchi, wheezing  HEART: Regular rate and rhythm; No murmurs, rubs, or gallops  ABDOMEN: Soft, Nontender, Nondistended; Bowel sounds present, no rebound   EXTREMITIES:  2+ Peripheral Pulses, No clubbing, cyanosis, or edema  SKIN: No rashes or lesions  BACK: no pressor sore   NERVOUS SYSTEM:  Alert & Oriented X3     LABS:                           Rapid HIV-1/2 Antibody: Nonreact (09-10 @ 17:48)      Vancomycin Level, Trough: 6.1 ug/mL (09-13 @ 04:16)        Culture Results:   No growth to date. (09-11 @ 16:29)  Culture Results:   No growth to date. (09-11 @ 16:29)  Culture Results:   <10,000 CFU/mL Normal Urogenital Hillary (09-10 @ 21:11)  Culture Results:   No growth to date. (09-10 @ 21:09)  Culture Results:   No growth to date. (09-10 @ 20:56)                Radiology:

## 2020-09-14 NOTE — DISCHARGE NOTE PROVIDER - NSDCFUADDINST_GEN_ALL_CORE_FT
It is important to see your primary physician as well as other necessary consultants within the next week to perform a comprehensive medical review.  Call their offices for an appointment as soon as you leave the hospital.  If you do not have a primary physician or cant reach him, contact the [facility] Physician Referral Service at [phone].  Your medical issues appear to be stable at this time, but if your symptoms recur or worsen, contact your physicians and/or return to the hospital if necessary.  If you encounter any issues or questions with your medication, call your physicians before stopping the medication.

## 2020-09-14 NOTE — DISCHARGE NOTE NURSING/CASE MANAGEMENT/SOCIAL WORK - PATIENT PORTAL LINK FT
You can access the FollowMyHealth Patient Portal offered by [facility] by registering at the following website: [email or web address]. By joining [facility]’s FollowMyHealth portal, you will also be able to view your health information using other applications (apps) compatible with our system.

## 2020-09-15 LAB
CULTURE RESULTS: SIGNIFICANT CHANGE UP
CULTURE RESULTS: SIGNIFICANT CHANGE UP
SPECIMEN SOURCE: SIGNIFICANT CHANGE UP
SPECIMEN SOURCE: SIGNIFICANT CHANGE UP

## 2020-09-17 DIAGNOSIS — L03.213 PERIORBITAL CELLULITIS: ICD-10-CM

## 2020-09-17 DIAGNOSIS — R21 RASH AND OTHER NONSPECIFIC SKIN ERUPTION: ICD-10-CM

## 2020-09-17 DIAGNOSIS — G47.00 INSOMNIA, UNSPECIFIED: ICD-10-CM

## 2020-09-17 DIAGNOSIS — B02.31 ZOSTER CONJUNCTIVITIS: ICD-10-CM

## 2021-05-25 ENCOUNTER — EMERGENCY (EMERGENCY)
Facility: HOSPITAL | Age: 41
LOS: 0 days | Discharge: HOME | End: 2021-05-25
Attending: STUDENT IN AN ORGANIZED HEALTH CARE EDUCATION/TRAINING PROGRAM
Payer: MEDICAID

## 2021-05-25 VITALS
DIASTOLIC BLOOD PRESSURE: 85 MMHG | HEIGHT: 69 IN | SYSTOLIC BLOOD PRESSURE: 123 MMHG | RESPIRATION RATE: 18 BRPM | HEART RATE: 71 BPM | OXYGEN SATURATION: 99 % | TEMPERATURE: 98 F

## 2021-05-25 DIAGNOSIS — T78.40XA ALLERGY, UNSPECIFIED, INITIAL ENCOUNTER: ICD-10-CM
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
## 2021-05-25 DIAGNOSIS — R60.9 EDEMA, UNSPECIFIED: ICD-10-CM
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
## 2021-05-25 PROBLEM — Z78.9 OTHER SPECIFIED HEALTH STATUS: Chronic | Status: ACTIVE | Noted: 2020-09-10

## 2021-05-25 PROCEDURE — 99284 EMERGENCY DEPT VISIT MOD MDM: CPT

## 2021-05-25 RX ADMIN — Medication 50 MILLIGRAM(S): at 08:53

## 2021-05-25 NOTE — ED PROVIDER NOTE - ENMT, MLM
Airway patent, Nasal mucosa clear. Mouth with normal mucosa. Throat has no vesicles, no oropharyngeal exudates and uvula is midline. mild swelling around bilateral eyes with no erythema, ttp

## 2021-05-25 NOTE — ED PROVIDER NOTE - OBJECTIVE STATEMENT
40m no pmhx presenting for 3 days of swelling around his eyes and scalp itching. used a new hair dye the day before symptoms began. no fever. no trauma. no change in vision. no weakness or lethargy. no chest pain, cough, sob, abdo pain, or other rashes

## 2021-05-25 NOTE — ED ADULT TRIAGE NOTE - CHIEF COMPLAINT QUOTE
pt complaining of swelling to bilateral eyes since yesterday. states he had hair dye applied to his hair 4 days ago. thinks he might have had a reaction to it. denies any tongue swelling or respiratory distress. able to speak in full sentences.

## 2021-05-25 NOTE — ED PROVIDER NOTE - SKIN, MLM
scalp dry with erythema without warmth. Skin otherwise normal color for race, warm, dry and intact. No other evidence of rash.

## 2021-05-25 NOTE — ED PROVIDER NOTE - PATIENT PORTAL LINK FT
You can access the FollowMyHealth Patient Portal offered by [facility] by registering at the following website: [email or web address]. By joining [facility]’s FollowMyHealth portal, you will also be able to view your health information using other applications (apps) compatible with our system.

## 2021-05-25 NOTE — ED PROVIDER NOTE - CLINICAL SUMMARY MEDICAL DECISION MAKING FREE TEXT BOX
patient with clinical appearance of superficial topical allergic reaction without systemic symptoms or signs of anaphylaxis. no sign of infectious etiology of rash as vitals normal and no warmth or ttp. will dc with steroid and benadryl

## 2021-05-25 NOTE — ED PROVIDER NOTE - CARE PROVIDER_API CALL
Dre Casillas  ALLERGY AND IMMUNOLOGY  [address], [address]  [place] [address]  Phone: [phone]  Fax: [phone]  Follow Up Time:

## 2022-01-13 NOTE — ED ADULT TRIAGE NOTE - NS ED NURSE BANDS TYPE
[FreeTextEntry1] : Mr. ROSALES is a 79 year male with a history of HTN, allergies HL, CAD, osteoarthritis and osteoporosis, covid-19 infection (11/2020) abnormal CT low vitamin D, anxiety, prior 30+ pack year smoking, who now comes in for an f/p pulmonary evaluation. His chief complaint is Mild COPD/asthma/allergies/PND/ abnormal CT/ low vitamin D-stable\par \par The patient's SOB is felt to be multifactorial:\par -poor mechanics of breathing\par -out of shape/overweight\par -Pulmonary -Asthma/Mild COPD \par -Cardiac (Dr. Plata) \par \par Problem 1: Mild COPD/ asthma \par -Full PFTs to follow \par -Alpha 1 antitrypsin levels \par COPD is a progressive disease and although it cant be cured, appropriate management can slow its progression, reduce frequency and severity of exacerbations, and improve symptoms and the patient quality of life. Hospitalizations are the greatest contributor to the total COPD costs and account for up to 87% of total COPD related costs. Exacerbations are the main cause of admissions and subsequently account for the 40%-75% of COPD costs. Inhaled maintenance therapy reduces the incidence of exacerbations in patients with stable CPPD. Incorrect inhaler use and nonadherence are major obstacles to achieving COPD treatments goals. Many COPD patients have challenges (impaired inhalation, limited dexterity, reduced cognition: that limit their ability to correctly use their COPD treatment devices resulting in reduced symptom control. Of most importance is smoking cessation and early intervention with respiratory illnesses and contemplation for pulmonary rehab to enhance quality of life. - Asthma is believed to be caused by inherited (genetic) and environmental factor, but its exact cause is unknown. asthma may be triggered by allergens, lung infections, or irritants in the air. Asthma triggers are different for each person \par \par Problem 2: history of allergies/ PND \par -Complete blood test to check for allergens \par -Environmental measures for allergies were encouraged including mattress and pillow cover, air purifier, and environmental controls. \par \par Problem 3: Abnormal CT (largest nodule 7mm)  (inflammatory, post-infection, intraparenchymal lymph nodes) \par -Re-review CT scan -CT 2/2022\par -complete Hypersensitivity panel,quantiferon gold test, ACE level\par - CAT scans are the only radiological modality to identify abnormality to identify abnormalities w/in the lings with regards to nodules/masses/lymph nodes. Risks, benefits were reviewed in detail. The guidelines for abnormalities include follow up CT scans at various intervals which could range from 6 weeks to 1 year intervals. If there is a change for the worse then considerations for a biopsy will be considered if you are a candidate. Second opinion evaluation with thoracic surgeon or an interventional radiologist could be offered, \par \par Problem 4: Low Vitamin D\par -Continue vitamin D supplement \par \par Problem 5: Covid-19 infection (11/2020)\par -s/p covid vaccination x3 \par - Clean your hands often. Wash your hands often with soap and water for at least 20 seconds, especially after blowing your nose, coughing, or sneezing, or having been in a public place.\par - If soap and water are not available, use a hand  that contains at least 60% alcohol.\par - To the extent possible, avoid touching high-touch surfaces in public places - elevator buttons, door handles, handrails, handshaking with people, etc. Use a tissue or your sleeve to cover your hand or finger if you must touch something.\par - Wash your hands after touching surfaces in public places.\par - Avoid touching your face, nose, eyes, etc.\par - Clean and disinfect your home to remove germs: practice routine cleaning of frequently touched surfaces (for example: tables, doorknobs, light switches, handles, desks, toilets, faucets, sinks & cell phones)\par - Avoid crowds, especially in poorly ventilated spaces. Your risk of exposure to respiratory viruses like COVID-19 may increase in crowded, closed-in settings with little air circulation if there are people in the crowd who are sick. All patients are recommended to practice social distancing and stay at least 6 feet away from others.\par - Avoid all non-essential travel including plane trips, and especially avoid embarking on cruise ships.\par -If COVID-19 is spreading in your community, take extra measures to put distance between yourself and other people to further reduce your risk of being exposed to this new virus.\par -Stay home as much as possible.\par - Consider ways of getting food brought to your house through family, social, or commercial networks\par -Be aware that the virus has been known to live in the air up to 3 hours post exposure, cardboard up to 24 hours post exposure, copper up to 4 hours post exposure, steel and plastic up to 2-3 days post exposure. Risk of transmission from these surfaces are affected by many variables.\par Immune Support Recommendations:\par -OTC Vitamin C 500mg BID \par -OTC Quercetin 250-500mg BID \par -OTC Zinc 75-100mg per day \par -OTC Melatonin 1 or 2 mg a night \par -OTC Vitamin D 1-4000mg per day \par -OTC Tonic Water 8oz per day\par Asthma and COVID19:\par You need to make sure your asthma is under control. This often requires the use of inhaled corticosteroids (and sometimes oral corticosteroids). Inhaled corticosteroids do not likely reduce your immune system’s ability to fight infections, but oral corticosteroids may. It is important to use the steps above to protect yourself to limit your exposure to any respiratory virus. \par \par Problem :Cardiac\par -Recommend cardiac follow up evaluation with cardiologist if needed \par \par Problem :out of shape\par - Weight loss, exercise and diet control were discussed and are highly encouraged. Treatment options were given such as aqua therapy, and contacting a nutritionist. Recommended to use the elliptical, stationary bike, less use of treadmill. Mindful eating was explained to the patient. Obesity is associated with worsening asthma, SOB, and potential for cardiac disease, diabetes, and other underlying medical conditions.\par \par Problem : Poor mechanics of breathing\par -Recommended Wim Hof and Buteyko breathing techniques \par - Proper breathing techniques were reviewed with an emphasis on exhalation. Patient instructed to breath in for 1 second and out for four seconds. Patient was encouraged not to talk while walking.\par \par Problem : Health Maintenance\par -s/p flu shot 2021 \par -recommended strep pneumonia vaccines: Prevnar-13 vaccine, follow by Pneumo vaccine 23 one year following (completed) \par -recommended early intervention for URIs\par -recommended regular osteoporosis evaluations\par -recommended early dermatological evaluations\par -recommended after the age of 50 to the age of 70, colonoscopy every 5 years\par \par f/u in 6-8 weeks\par pt is encouraged to call or fax the office with any questions or concerns.\par  Allergy;

## 2025-03-02 ENCOUNTER — EMERGENCY (EMERGENCY)
Facility: HOSPITAL | Age: 45
LOS: 0 days | Discharge: HOME | End: 2025-03-02
Attending: STUDENT IN AN ORGANIZED HEALTH CARE EDUCATION/TRAINING PROGRAM
Payer: MEDICAID

## 2025-03-02 VITALS
HEIGHT: 68 IN | WEIGHT: 149.91 LBS | TEMPERATURE: 98 F | OXYGEN SATURATION: 99 % | DIASTOLIC BLOOD PRESSURE: 77 MMHG | RESPIRATION RATE: 18 BRPM | HEART RATE: 95 BPM | SYSTOLIC BLOOD PRESSURE: 117 MMHG

## 2025-03-02 DIAGNOSIS — H02.844 EDEMA OF LEFT UPPER EYELID: ICD-10-CM

## 2025-03-02 DIAGNOSIS — H57.89 OTHER SPECIFIED DISORDERS OF EYE AND ADNEXA: ICD-10-CM

## 2025-03-02 DIAGNOSIS — H53.8 OTHER VISUAL DISTURBANCES: ICD-10-CM

## 2025-03-02 PROCEDURE — 99284 EMERGENCY DEPT VISIT MOD MDM: CPT

## 2025-03-02 RX ORDER — IBUPROFEN 200 MG
1 TABLET ORAL
Qty: 20 | Refills: 0
Start: 2025-03-02 | End: 2025-03-06

## 2025-03-02 RX ORDER — ERYTHROMYCIN 5 MG/G
1 OINTMENT OPHTHALMIC ONCE
Refills: 0 | Status: COMPLETED | OUTPATIENT
Start: 2025-03-02 | End: 2025-03-02

## 2025-03-02 RX ORDER — AMOXICILLIN AND CLAVULANATE POTASSIUM 500; 125 MG/1; MG/1
1 TABLET, FILM COATED ORAL ONCE
Refills: 0 | Status: COMPLETED | OUTPATIENT
Start: 2025-03-02 | End: 2025-03-02

## 2025-03-02 RX ORDER — AMOXICILLIN AND CLAVULANATE POTASSIUM 500; 125 MG/1; MG/1
1 TABLET, FILM COATED ORAL
Qty: 14 | Refills: 0
Start: 2025-03-02 | End: 2025-03-08

## 2025-03-02 RX ADMIN — AMOXICILLIN AND CLAVULANATE POTASSIUM 1 TABLET(S): 500; 125 TABLET, FILM COATED ORAL at 18:06

## 2025-03-02 RX ADMIN — ERYTHROMYCIN 1 APPLICATION(S): 5 OINTMENT OPHTHALMIC at 18:07

## 2025-03-02 NOTE — ED PROVIDER NOTE - WORK/EXCUSE FORM DATE

## 2025-03-02 NOTE — ED ADULT TRIAGE NOTE - CHIEF COMPLAINT QUOTE
pt c/o left eye redness, tearing and itchiness  for a month. also c/o   swelling of the eye and blurriness for a week. pt was seen at [facility] a month ago and prescribed antibiotic with no relief.  no history. pt c/o left eye redness, tearing and itchiness  for a month. also c/o   swelling of the eye  for a week. pt was seen at [facility] a month ago and prescribed antibiotic with no relief.  no history.

## 2025-03-02 NOTE — ED PROVIDER NOTE - NSFOLLOWUPINSTRUCTIONS_ED_ALL_ED_FT
Rest, drink plenty of fluids  Advance activity as tolerated  Continue all previously prescribed medications as directed  Follow up with your PMD - bring copies of your results  Return to the ER for worsening symptoms, fevers, severe pain, or other new or concerning symptoms

## 2025-03-02 NOTE — ED ADULT NURSE NOTE - OBJECTIVE STATEMENT
44 y.o male,A&Ox4,c/o eye redness. pt states having left eye redness, and tearing, and itchiness x 1 month. pt states swelling of the eye noted x 1 week.; pt states he was seen at another emergency room and told it was allergies, and given abx treatment but states no improvement. Patient denies any sob, difficulty breathing, dizziness, headache, cp, palpations, abdominal pain, n/v/d, fever, chills, numbness, tingling, urinary symptoms, LE swelling. pt states tearing, but no discharge from the eye.

## 2025-03-02 NOTE — ED ADULT NURSE NOTE - CHIEF COMPLAINT QUOTE
pt c/o left eye redness, tearing and itchiness  for a month. also c/o   swelling of the eye  for a week. pt was seen at [facility] a month ago and prescribed antibiotic with no relief.  no history.

## 2025-03-02 NOTE — ED PROVIDER NOTE - PATIENT PORTAL LINK FT
You can access the FollowMyHealth Patient Portal offered by [facility] by registering at the following website: [email or web address]. By joining [facility]’s FollowMyHealth portal, you will also be able to view your health information using other applications (apps) compatible with our system.

## 2025-03-02 NOTE — ED PROVIDER NOTE - PHYSICAL EXAMINATION
General appearance: Nontoxic appearing, conversant, afebrile    Eyes: anicteric sclerae, NISHI, EOMI, L eye slight conjunctival injection, IOP 14, pupils 3mm symmetric, negative fluorescein stain, 20/40 b/l   HENT: Atraumatic; oropharynx clear, MMM and no ulcerations, no pharyngeal erythema or exudate   Neck: Trachea midline; Full range of motion, supple   Pulm: normal respiratory effort and no intercostal retractions, normal work of breathing   Extremities: No peripheral edema, no gross deformities, FROM x4   Skin: Dry, normal temperature, turgor and texture; no rash   Psych: Appropriate affect, cooperative

## 2025-03-02 NOTE — ED ADULT NURSE NOTE - NSFALLUNIVINTERV_ED_ALL_ED
Bed/Stretcher in lowest position, wheels locked, appropriate side rails in place/Call bell, personal items and telephone in reach/Instruct patient to call for assistance before getting out of bed/chair/stretcher/Non-slip footwear applied when patient is off stretcher/[place] to call system/Physically safe environment - no spills, clutter or unnecessary equipment/Purposeful proactive rounding/Room/bathroom lighting operational, light cord in reach

## 2025-03-02 NOTE — ED PROVIDER NOTE - CLINICAL SUMMARY MEDICAL DECISION MAKING FREE TEXT BOX
45yo male with no pmh presenting with L eye redness and blurriness.  About 1 month ago went to [place] where he was having symptoms in b/l eyes.  Was prescribed hydroxyzine without relief.  Came in today for persistent symptoms with L eye seeming to get worse.  No fevers, loss of vision, trauma, fb sensation, ha, contacts.  Did not follow up with ophthalmology.  Fluorescein negative, slight injection possible conjunctivitis.  IOP 14, not c/w glaucoma.  Slight upper lid edema, will treat for conjunctivits/ possible early preseptal cellulitis.

## 2025-03-02 NOTE — ED PROVIDER NOTE - NSFOLLOWUPCLINICS_GEN_ALL_ED_FT
[facility] - Ophthalmology  Ophthalmology  [address], [address]  [place] [address]  Phone: [phone]  Fax: